# Patient Record
Sex: FEMALE | Race: BLACK OR AFRICAN AMERICAN | NOT HISPANIC OR LATINO | Employment: FULL TIME | URBAN - METROPOLITAN AREA
[De-identification: names, ages, dates, MRNs, and addresses within clinical notes are randomized per-mention and may not be internally consistent; named-entity substitution may affect disease eponyms.]

---

## 2023-10-08 ENCOUNTER — HOSPITAL ENCOUNTER (EMERGENCY)
Facility: HOSPITAL | Age: 48
Discharge: HOME | End: 2023-10-08
Attending: EMERGENCY MEDICINE

## 2023-10-08 VITALS
OXYGEN SATURATION: 99 % | HEART RATE: 69 BPM | SYSTOLIC BLOOD PRESSURE: 112 MMHG | TEMPERATURE: 97 F | BODY MASS INDEX: 36.7 KG/M2 | HEIGHT: 64 IN | WEIGHT: 215 LBS | DIASTOLIC BLOOD PRESSURE: 61 MMHG | RESPIRATION RATE: 16 BRPM

## 2023-10-08 DIAGNOSIS — M54.2 NECK PAIN: Primary | ICD-10-CM

## 2023-10-08 PROCEDURE — 2500000005 HC RX 250 GENERAL PHARMACY W/O HCPCS

## 2023-10-08 PROCEDURE — 2500000004 HC RX 250 GENERAL PHARMACY W/ HCPCS (ALT 636 FOR OP/ED)

## 2023-10-08 PROCEDURE — 99284 EMERGENCY DEPT VISIT MOD MDM: CPT | Performed by: EMERGENCY MEDICINE

## 2023-10-08 PROCEDURE — 20552 NJX 1/MLT TRIGGER POINT 1/2: CPT | Performed by: EMERGENCY MEDICINE

## 2023-10-08 PROCEDURE — 20552 NJX 1/MLT TRIGGER POINT 1/2: CPT

## 2023-10-08 PROCEDURE — 20552 NJX 1/MLT TRIGGER POINT 1/2: CPT | Mod: GC | Performed by: EMERGENCY MEDICINE

## 2023-10-08 PROCEDURE — 96372 THER/PROPH/DIAG INJ SC/IM: CPT

## 2023-10-08 RX ORDER — KETOROLAC TROMETHAMINE 30 MG/ML
30 INJECTION, SOLUTION INTRAMUSCULAR; INTRAVENOUS ONCE
Status: COMPLETED | OUTPATIENT
Start: 2023-10-08 | End: 2023-10-08

## 2023-10-08 RX ORDER — BUPIVACAINE HYDROCHLORIDE 5 MG/ML
2 INJECTION, SOLUTION PERINEURAL ONCE
Status: DISCONTINUED | OUTPATIENT
Start: 2023-10-08 | End: 2023-10-08

## 2023-10-08 RX ORDER — CYCLOBENZAPRINE HCL 10 MG
10 TABLET ORAL 2 TIMES DAILY PRN
Qty: 10 TABLET | Refills: 0 | Status: SHIPPED | OUTPATIENT
Start: 2023-10-08 | End: 2023-10-13

## 2023-10-08 RX ORDER — LIDOCAINE 50 MG/G
1 PATCH TOPICAL DAILY
Qty: 10 PATCH | Refills: 0 | Status: SHIPPED | OUTPATIENT
Start: 2023-10-08

## 2023-10-08 RX ORDER — NAPROXEN 500 MG/1
500 TABLET ORAL
Qty: 30 TABLET | Refills: 0 | Status: SHIPPED | OUTPATIENT
Start: 2023-10-08 | End: 2023-10-23

## 2023-10-08 RX ORDER — BUPIVACAINE HYDROCHLORIDE 5 MG/ML
2 INJECTION, SOLUTION EPIDURAL; INTRACAUDAL ONCE
Status: COMPLETED | OUTPATIENT
Start: 2023-10-08 | End: 2023-10-08

## 2023-10-08 RX ORDER — LIDOCAINE 560 MG/1
1 PATCH PERCUTANEOUS; TOPICAL; TRANSDERMAL DAILY
Status: DISCONTINUED | OUTPATIENT
Start: 2023-10-08 | End: 2023-10-08 | Stop reason: HOSPADM

## 2023-10-08 RX ADMIN — LIDOCAINE 1 PATCH: 4 PATCH TOPICAL at 09:38

## 2023-10-08 RX ADMIN — KETOROLAC TROMETHAMINE 30 MG: 30 INJECTION, SOLUTION INTRAMUSCULAR; INTRAVENOUS at 09:38

## 2023-10-08 RX ADMIN — BUPIVACAINE HYDROCHLORIDE 10 MG: 5 INJECTION, SOLUTION EPIDURAL; INTRACAUDAL at 09:15

## 2023-10-08 ASSESSMENT — COLUMBIA-SUICIDE SEVERITY RATING SCALE - C-SSRS
2. HAVE YOU ACTUALLY HAD ANY THOUGHTS OF KILLING YOURSELF?: NO
6. HAVE YOU EVER DONE ANYTHING, STARTED TO DO ANYTHING, OR PREPARED TO DO ANYTHING TO END YOUR LIFE?: NO
1. IN THE PAST MONTH, HAVE YOU WISHED YOU WERE DEAD OR WISHED YOU COULD GO TO SLEEP AND NOT WAKE UP?: NO

## 2023-10-08 ASSESSMENT — PAIN SCALES - GENERAL: PAINLEVEL_OUTOF10: 10 - WORST POSSIBLE PAIN

## 2023-10-08 ASSESSMENT — PAIN - FUNCTIONAL ASSESSMENT: PAIN_FUNCTIONAL_ASSESSMENT: 0-10

## 2023-10-08 NOTE — ED PROVIDER NOTES
HPI   Chief Complaint   Patient presents with    Neck Pain     Pt presents to the ed for r sided neck pain since Friday.  Pt denies injury       Patient is an otherwise healthy 49-year-old female presenting for evaluation of 2 days of right-sided neck pain.  No inciting traumas.  She states that is very tight and painful causing her to have difficulties with sleeping or turning her head.  This is never happened before.  She has never had surgery on her neck.  No fevers chills numbness tingling weakness.  No infectious symptoms including fevers or chills no chest pain shortness of breath abdominal pain or swelling.                          No data recorded                Patient History   Past Medical History:   Diagnosis Date    Complete or unspecified spontaneous  without complication         Encounter for  delivery without indication      delivery delivered    Encounter for screening for malignant neoplasm of cervix     Encounter for Papanicolaou smear for cervical cancer screening    Encounter for screening for malignant neoplasm of vagina     Vaginal Pap smear    Other conditions influencing health status     History of pregnancy    Other conditions influencing health status     Onset of menses    Personal history of contraception     Personal history of contraceptive use    Personal history of other diseases of the nervous system and sense organs     History of migraine    Personal history of other medical treatment     History of screening mammography    Unspecified diabetes mellitus in pregnancy, unspecified trimester     Maternal diabetes mellitus, antepartum     Past Surgical History:   Procedure Laterality Date     SECTION, LOW TRANSVERSE  2014     Section Low Transverse    OTHER SURGICAL HISTORY  2014    Tubal Ligation During  Section     No family history on file.  Social History     Tobacco Use    Smoking status: Not on file     Smokeless tobacco: Not on file   Substance Use Topics    Alcohol use: Not on file    Drug use: Not on file       Physical Exam   ED Triage Vitals [10/08/23 0756]   Temp Heart Rate Resp BP   36.1 °C (97 °F) 69 16 112/61      SpO2 Temp src Heart Rate Source Patient Position   99 % -- Monitor --      BP Location FiO2 (%)     -- --       Physical Exam  Vitals and nursing note reviewed.   Constitutional:       General: She is not in acute distress.     Appearance: She is well-developed.   HENT:      Head: Normocephalic and atraumatic.   Eyes:      Conjunctiva/sclera: Conjunctivae normal.   Neck:      Comments: Paraspinal musculature along right side of neck is hypertonic and warm to touch.  Cardiovascular:      Rate and Rhythm: Normal rate and regular rhythm.      Heart sounds: No murmur heard.  Pulmonary:      Effort: Pulmonary effort is normal. No respiratory distress.      Breath sounds: Normal breath sounds.   Abdominal:      Palpations: Abdomen is soft.      Tenderness: There is no abdominal tenderness.   Musculoskeletal:         General: No swelling.      Cervical back: Pain with movement present. No spinous process tenderness. Decreased range of motion.   Skin:     General: Skin is warm and dry.      Capillary Refill: Capillary refill takes less than 2 seconds.   Neurological:      Mental Status: She is alert.   Psychiatric:         Mood and Affect: Mood normal.         ED Course & MDM   Diagnoses as of 10/08/23 0931   Neck pain       Medical Decision Making  Patient was evaluated in the emergency department for neck pain.  Her vitals were stable upon arrival to the ED. no symptoms of paresthesias or weakness.  Pulses equal and intact in bilateral upper extremities.  Patient has warm hypertonic right sided musculature along his paraspinal muscles within her cervical spine.  Patient was given heat packs lidocaine patch Toradol injection here and will be provided with prescription for lidocaine patches naproxen and  Flexeril for home.  Discussed risks of muscle relaxers and operating heavy machinery.  Discussed other possible treatments including gentle stretching and went over some stretches to assist with neck pain.  Also discussed using heat packs at home.  Discussed a trigger point injection into the area for the patient and after risk-benefit discussion patient agreed to procedure.  Patient tolerated procedure well and had immediate relief of pain in the area.  Patient is feeling well no other concerns today and will be discharged home with primary care follow-up.  Prescription sent to patient's preferred pharmacy.  No further questions at this time.        Procedure  Nerve Block    Performed by: Gabriella Ramírez DO  Authorized by: Haris Fischer DO    Consent:     Consent obtained:  Verbal    Consent given by:  Patient    Risks, benefits, and alternatives were discussed: yes      Risks discussed:  Nerve damage, swelling, unsuccessful block, pain and bleeding    Alternatives discussed:  No treatment and alternative treatment  Universal protocol:     Patient identity confirmed:  Verbally with patient and hospital-assigned identification number  Indications:     Indications:  Pain relief  Location:     Body area:  Head    Head nerve:  Cervical    Laterality:  Right  Pre-procedure details:     Skin preparation:  Alcohol  Procedure details:     Block needle gauge:  22 G    Anesthetic injected:  Bupivacaine 0.5% w/o epi (1cc)    Injection procedure:  Anatomic landmarks identified, negative aspiration for blood, incremental injection and anatomic landmarks palpated    Paresthesia:  None  Post-procedure details:     Outcome:  Pain relieved    Procedure completion:  Tolerated well, no immediate complications  Comments:      R paraspinal musculature trigger point injection       Gabriella Ramírez DO  Resident  10/08/23 0944

## 2024-03-09 ENCOUNTER — HOSPITAL ENCOUNTER (EMERGENCY)
Facility: HOSPITAL | Age: 49
Discharge: HOME | End: 2024-03-09
Attending: STUDENT IN AN ORGANIZED HEALTH CARE EDUCATION/TRAINING PROGRAM
Payer: COMMERCIAL

## 2024-03-09 ENCOUNTER — APPOINTMENT (OUTPATIENT)
Dept: RADIOLOGY | Facility: HOSPITAL | Age: 49
End: 2024-03-09
Payer: COMMERCIAL

## 2024-03-09 VITALS
HEART RATE: 81 BPM | OXYGEN SATURATION: 98 % | RESPIRATION RATE: 18 BRPM | DIASTOLIC BLOOD PRESSURE: 77 MMHG | HEIGHT: 63 IN | SYSTOLIC BLOOD PRESSURE: 129 MMHG | WEIGHT: 215 LBS | TEMPERATURE: 97.5 F | BODY MASS INDEX: 38.09 KG/M2

## 2024-03-09 DIAGNOSIS — S93.402A SPRAIN OF LEFT ANKLE, INITIAL ENCOUNTER: Primary | ICD-10-CM

## 2024-03-09 PROCEDURE — 99284 EMERGENCY DEPT VISIT MOD MDM: CPT | Performed by: STUDENT IN AN ORGANIZED HEALTH CARE EDUCATION/TRAINING PROGRAM

## 2024-03-09 PROCEDURE — 99283 EMERGENCY DEPT VISIT LOW MDM: CPT

## 2024-03-09 PROCEDURE — 73610 X-RAY EXAM OF ANKLE: CPT | Mod: LT

## 2024-03-09 PROCEDURE — 73610 X-RAY EXAM OF ANKLE: CPT | Mod: LEFT SIDE | Performed by: RADIOLOGY

## 2024-03-09 ASSESSMENT — COLUMBIA-SUICIDE SEVERITY RATING SCALE - C-SSRS
1. IN THE PAST MONTH, HAVE YOU WISHED YOU WERE DEAD OR WISHED YOU COULD GO TO SLEEP AND NOT WAKE UP?: NO
6. HAVE YOU EVER DONE ANYTHING, STARTED TO DO ANYTHING, OR PREPARED TO DO ANYTHING TO END YOUR LIFE?: NO
2. HAVE YOU ACTUALLY HAD ANY THOUGHTS OF KILLING YOURSELF?: NO

## 2024-03-09 NOTE — ED TRIAGE NOTES
"Hx of bilateral ankle fx, 6 days ago pt states she heard a \"pop\" and is now having pain in right ankle. Attempted nsaids, tylenol, ice, elevation. Pt denies trauma and reports \"just walking.\" Ambulatory in triage.   "

## 2024-03-09 NOTE — DISCHARGE INSTRUCTIONS
You were seen in the emergency department for left ankle pain.  Your x-ray showed no problems with hardware and no fractures or dislocation.  At home, you can continue to rest, ice, and elevate the ankle.  We have provided a post-op shoe for support and a referral to orthopedic surgery for follow-up.  At home, you can take Tylenol or ibuprofen for pain.  You should come back to the emergency department if you have worsening pain, inability to walk or support weight on the ankle, or any new concerns.

## 2024-03-09 NOTE — ED PROVIDER NOTES
CC: Ankle Pain     HPI:  Radha Ramirez is a 48 y.o. female with PMHx bilateral ankle fx who presents with L ankle pain for 6 days. Reports she was walking to the bathroom at home and felt a pop followed by pain. Has tried NSAIDs, tylenol, ice, elevation at home without resolution of her symptoms.    On chart review, L ankle is s/p ORIF in 2019.     Limitations to History: none  Additional History provided by: N/A    External Records Reviewed:  Recent available ED and inpatient notes reviewed in EMR.    PMHx/PSHx:  Per HPI.   - has a past medical history of Complete or unspecified spontaneous  without complication, Encounter for  delivery without indication, Encounter for screening for malignant neoplasm of cervix, Encounter for screening for malignant neoplasm of vagina, Other conditions influencing health status, Other conditions influencing health status, Personal history of contraception, Personal history of other diseases of the nervous system and sense organs, Personal history of other medical treatment, and Unspecified diabetes mellitus in pregnancy, unspecified trimester.  - has a past surgical history that includes  section, low transverse (2014) and Other surgical history (2014).    Medications:  Reviewed in EMR. See EMR for complete list of medications and doses.    Allergies:  Patient has no known allergies.    Social History:  - Tobacco:  has no history on file for tobacco use.   - Alcohol:  has no history on file for alcohol use.   - Illicit Drugs:  has no history on file for drug use.     ROS:  Per HPI.     ???????????????????????????????????????????????????????????????  Triage Vitals:  T 36.4 °C (97.5 °F)  HR 81  /77  RR 18  O2 98 %      Physical Exam  Vitals reviewed.   Constitutional:       General: She is not in acute distress.  HENT:      Head: Normocephalic and atraumatic.      Mouth/Throat:      Mouth: Mucous membranes are moist.      Pharynx:  Oropharynx is clear.   Eyes:      Conjunctiva/sclera: Conjunctivae normal.      Pupils: Pupils are equal, round, and reactive to light.   Neck:      Comments: 2+ PT pulses bilaterally  Cardiovascular:      Rate and Rhythm: Normal rate and regular rhythm.      Heart sounds: No murmur heard.     No friction rub. No gallop.   Pulmonary:      Effort: Pulmonary effort is normal.      Breath sounds: No wheezing, rhonchi or rales.   Abdominal:      Palpations: Abdomen is soft.      Tenderness: There is no abdominal tenderness. There is no guarding.   Musculoskeletal:         General: No tenderness or deformity.      Cervical back: Normal range of motion.      Right lower leg: No edema.      Left lower leg: No edema.      Comments: Left ankle ROM somewhat limited by pain   Skin:     General: Skin is warm and dry.      Findings: No rash.   Neurological:      General: No focal deficit present.      Mental Status: She is alert and oriented to person, place, and time.   Psychiatric:         Mood and Affect: Mood normal.         Behavior: Behavior normal.       ???????????????????????????????????????????????????????????????  ED Course:  ED Course as of 03/09/24 1217   Sat Mar 09, 2024   1117 XR ankle left 3+ views  I independently reviewed plain film of the left ankle: Hardware appears stable from postop films.  No obvious fracture or dislocation. [HH]      ED Course User Index  [HH] Louann Rodriguez MD         Diagnoses as of 03/09/24 1217   Sprain of left ankle, initial encounter       EKG & Images:  Independently reviewed, See ED Course      MDM:  Radha Ramirez is a 48 y.o. female with PMHx of bilateral ankle ORIF who presents with L ankle pain.  On arrival, the patient was afebrile and hemodynamically stable.  On exam, patient is able to support weight on the foot, there is no significant edema or tenderness to palpation of the left ankle, however ROM is somewhat limited by pain.     Differential diagnosis includes ankle  sprain, fracture, hardware malfunction, or other soft tissue injury.    XR L ankle with stable appearance of hardware, no acute fx or malalignment.  Discussed this with the patient, who expressed understanding.  She was provided with a postop shoe for ankle support and advised to continue to rest, ice, and elevate the ankle. She was provided with a referral for follow-up with orthopedics.  Patient was discharged home in stable condition.    Final diagnoses:   [S93.402A] Sprain of left ankle, initial encounter     Patient seen and discussed with Dr. Avelar.    Social Determinants Limiting Care:  None identified    Disposition:  Discharge    Louann Rodriguez MD   Emergency Medicine PGY1  [facility]     Disclaimer: This note was dictated by speech recognition. Minor errors in transcription may be present     ? [employer] last updated 3/9/2024 12:17 PM        Louann Rodriguez MD  Resident  03/09/24 0109

## 2024-04-15 ENCOUNTER — HOSPITAL ENCOUNTER (OUTPATIENT)
Dept: RADIOLOGY | Facility: HOSPITAL | Age: 49
Discharge: HOME | End: 2024-04-15
Payer: COMMERCIAL

## 2024-04-15 ENCOUNTER — OFFICE VISIT (OUTPATIENT)
Dept: ORTHOPEDIC SURGERY | Facility: HOSPITAL | Age: 49
End: 2024-04-15
Payer: COMMERCIAL

## 2024-04-15 VITALS — BODY MASS INDEX: 38.09 KG/M2 | HEIGHT: 63 IN | WEIGHT: 215 LBS

## 2024-04-15 DIAGNOSIS — M25.572 ACUTE LEFT ANKLE PAIN: ICD-10-CM

## 2024-04-15 DIAGNOSIS — M25.872 ANKLE IMPINGEMENT SYNDROME, LEFT: Primary | ICD-10-CM

## 2024-04-15 PROCEDURE — 73610 X-RAY EXAM OF ANKLE: CPT | Mod: LT

## 2024-04-15 PROCEDURE — 99214 OFFICE O/P EST MOD 30 MIN: CPT | Performed by: ORTHOPAEDIC SURGERY

## 2024-04-15 PROCEDURE — 73610 X-RAY EXAM OF ANKLE: CPT | Mod: LEFT SIDE | Performed by: RADIOLOGY

## 2024-04-15 ASSESSMENT — PAIN DESCRIPTION - DESCRIPTORS: DESCRIPTORS: SHARP

## 2024-04-15 ASSESSMENT — PAIN - FUNCTIONAL ASSESSMENT: PAIN_FUNCTIONAL_ASSESSMENT: 0-10

## 2024-04-15 NOTE — PROGRESS NOTES
HISTORY OF PRESENT ILLNESS  This is a pleasant 48 y.o. year old female  who presents on 04/15/2024 at the request of Monique Burnette MD for evaluation of  left ankle  pain that has been present over/since 4 weeks.    How the condition occurred or started: insidious, no recent trauma  Location of pain (patient points to): anterior area pinching  Quality of pain:  better now, mild  Modifying Factors: worse with walking after sitting  Associated Signs and symptoms: no swelling, no catching or deep popping  Previous Treatment: ED gave her a post-op shoe and she weaned out of it    PHYSICAL EXAMINATION  Constitutional Exam: patient's height and weight reviewed, well-kempt  Psychiatric Exam: alert and oriented x 3, appropriate mood and behavior  Eye Exam: CHARLEE, EOMI  Pulmonary Exam: breathing non-labored, no apparent distress  Lymphatic exam: no appreciable lymphadenopathy in the lower extremities  Cardiovascular exam: DP pulses 2+ bilaterally, PT 2+ bilaterally, toes are pink with good capillary refill, no pitting edema  Skin exam: no open lesions, rashes, abrasions or ulcerations  Neurological exam: sensation to light touch intact in both lower extremities in peripheral and dermatomal distributions (except for any abnormalities noted in musculoskeletal exam)    Musculoskeletal exam: right ankle: soreness over anterior capsule of ankle ant-med and ant-lateral, no pain over ATFL or CFL, no ankle effusion, ROM of ankle good, neg DF-eversion stress test, no pain to palpation over peroneals and posterior tibial tendon, neg shuck test, neg anterior drawer and neg talar tilt test, no pain over bony structures of foot and ankle, normal standing alignment, min pain with passive hyperdorsiflexion test    DATA/RESULTS REVIEW: I personally reviewed the patient's x-ray images and reports of the  left ankle shows well healed distal fibular fracture with no screw loosening, ankle alignment appropriate, tibiotalar joint spacing  "maintained and no spur formation, no acute findings or fractures .     ASSESSMENT: left ankle soft tissue anterior impingement, hx of left ankle distal fibular ORIF 3 years ago  PLAN: I discussed with the patient the differential diagnosis, complex post-traumatic related, overuse nature of the condition(s) and available treatment option(s).  Reviewed with her key exercises to do to work on strengthening since she has some residual left calf atrophy.  Gave her a program and instructed her on cross friction massage with aspercreme/voltaren gel over the anterior capsule.  She would like to avoid doing formal PT at this time.  Discussed cardio exercise progression starting with bike first and working up to walking program.  She should do well with nonop management.  FUV prn.  The patient's questions were answered in detail.      Note dictated with [employer] software, completed without full type editing to avoid delay.      Dear Referring Physician,  It was my pleasure to see your patient, in the office today.  Please refer to the detailed notes above for my findings and recommendations.  Thank you once again for your consultation request.  If you have any further questions or concerns, please feel free to contact me via email or at the phone number and address below.  Sincerely,    Sunitha Giles MD   of Orthopedic Surgery, [employer]  Dual Fellowship-trained in Orthopedic Sports Medicine (Knee and Shoulder), and Foot and Ankle Surgery  The  [facility] [place]   [employer] Subspecialty Certificate in Orthopedic Sports Medicine  Head Team Physician Case \"[place]\" and [facility] \"[place]\"  Team USA OP Physician [id number] Paralympic Games  Team [employer] US Figure Skating Medical Practitioner, including International Event Coverage  Director, Foot and Ankle Division, Department of Orthopedics    [facility] " [place] [id number]  [email or web address]  Office [phone]

## 2024-04-17 ENCOUNTER — OFFICE VISIT (OUTPATIENT)
Dept: OTOLARYNGOLOGY | Facility: CLINIC | Age: 49
End: 2024-04-17
Payer: COMMERCIAL

## 2024-04-17 ENCOUNTER — CLINICAL SUPPORT (OUTPATIENT)
Dept: AUDIOLOGY | Facility: CLINIC | Age: 49
End: 2024-04-17
Payer: COMMERCIAL

## 2024-04-17 VITALS — WEIGHT: 210 LBS | HEIGHT: 63 IN | BODY MASS INDEX: 37.21 KG/M2

## 2024-04-17 DIAGNOSIS — H91.8X3 ASYMMETRICAL HEARING LOSS: ICD-10-CM

## 2024-04-17 DIAGNOSIS — H91.22 SUDDEN LEFT HEARING LOSS: ICD-10-CM

## 2024-04-17 DIAGNOSIS — H90.3 SENSORINEURAL HEARING LOSS (SNHL) OF BOTH EARS: Primary | ICD-10-CM

## 2024-04-17 DIAGNOSIS — H90.3 BILATERAL SENSORINEURAL HEARING LOSS: Primary | ICD-10-CM

## 2024-04-17 PROCEDURE — 92567 TYMPANOMETRY: CPT

## 2024-04-17 PROCEDURE — 92557 COMPREHENSIVE HEARING TEST: CPT

## 2024-04-17 PROCEDURE — 99204 OFFICE O/P NEW MOD 45 MIN: CPT | Performed by: NURSE PRACTITIONER

## 2024-04-17 RX ORDER — PREDNISONE 10 MG/1
TABLET ORAL DAILY
Qty: 42 TABLET | Refills: 0 | Status: SHIPPED | OUTPATIENT
Start: 2024-04-17 | End: 2024-04-27

## 2024-04-17 RX ORDER — OMEPRAZOLE 20 MG/1
CAPSULE, DELAYED RELEASE ORAL
Qty: 10 CAPSULE | Refills: 0 | Status: SHIPPED | OUTPATIENT
Start: 2024-04-17

## 2024-04-17 ASSESSMENT — PAIN - FUNCTIONAL ASSESSMENT: PAIN_FUNCTIONAL_ASSESSMENT: 0-10

## 2024-04-17 ASSESSMENT — PATIENT HEALTH QUESTIONNAIRE - PHQ9
2. FEELING DOWN, DEPRESSED OR HOPELESS: NOT AT ALL
1. LITTLE INTEREST OR PLEASURE IN DOING THINGS: NOT AT ALL
SUM OF ALL RESPONSES TO PHQ9 QUESTIONS 1 AND 2: 0

## 2024-04-17 ASSESSMENT — PAIN SCALES - GENERAL: PAINLEVEL_OUTOF10: 0 - NO PAIN

## 2024-04-17 NOTE — PROGRESS NOTES
AUDIOLOGIC EVALUATION      Name:  Radha Ramirez  :  1975  Age:  48 y.o.  Date of Evaluation:  2024    Time: 900-270    HISTORY:  Radha Ramirez, 48 y.o., was seen for an audiologic assessment prior to ENT evaluation. She reported decreased hearing in both ears following a party on 3/31/24. She feels that her left ear is poorer. She noted that sounds seem far away and speech seems distorted. She also feels like she hears her own voice in her head, as if she had a cold. She has a history of myringotomy in childhood related to an ear infection, but no tubes were placed. She denied otalgia, otorrhea, tinnitus, dizziness, aural pressure/fullness, family history of hearing loss, and recent falls.       RESULTS:  Otoscopic Evaluation:  Right Ear: Unremarkable  Left Ear: Unremarkable    Immittance Measures:  Right Ear: Type A -- indicating normal volume, pressure, and static compliance  Left Ear: Type A -- indicating normal volume, pressure, and static compliance    Acoustic Reflexes:  Right Ear: Thresholds present at expected levels for 500-4000 Hz.  Left Ear: Thresholds present at expected levels for 500-4000 Hz.    Pure Tone Audiometry:    Right Ear: Hearing within normal limits sloping to a severe sensorineural hearing loss  Left Ear: Hearing within normal limits sloping to a severe sensorineural hearing loss    Asymmetry: Yes, left ear poorer at 3000 Hz    No previous audiogram to compare today's results to.    Reliability:   Good    Speech Audiometry:   Right: Speech Reception Threshold (SRT) was obtained at 25 dBHL.   SRT/PTA in Good agreement with pure tone average.    Left: Speech Reception Threshold (SRT) was obtained at 30 dBHL.   SRT/PTA in Good agreement with pure tone average.    Word Recognition Scores (WRS):  Right Ear: excellent (100%) in quiet when words were presented at 75 dBHL w/ masking.  Left Ear: excellent (96%) in quiet when words were presented at 80 dBHL w/ masking.      Asymmetry: No      IMPRESSIONS:  Today's testing revealed normal ear canal volume, middle ear pressure, and tympanic membrane compliance. She has hearing within normal limits sloping to a severe hearing loss in both ears. The left ear is poorer at 3000 Hz. I don't have an previous audiograms to compare today's results to. The results were discussed with the patient. She should follow-up with ENT as scheduled.       RECOMMENDATIONS:  1.) Continue medical follow up with primary care provider and/or Ears Nose and Throat (ENT) provider as recommended.  2.) Return for audiologic evaluation in conjunction with medical management or annually (whichever is sooner) to monitor hearing sensitivity and assess middle ear status or sooner should concerns arise.  3.) Avoid exposure to loud sounds by moving away from the noise, turning down the volume, or wearing proper hearing protection correctly.      Kirill Avelar, CCC-A  Clinical Audiologist          KEY  SNHL Sensorineural Hearing Loss   CHL Conductive Hearing Loss   MHL Mixed Hearing Loss   SSNHL Sudden Sensorineural Hearing Loss   WNL Within Normal Limits   PTA Pure Tone Average   TM Tympanic Membrane   ECV Ear Canal Volume   SRT Speech Reception Threshold   WRS Word Recognition Score

## 2024-04-17 NOTE — PROGRESS NOTES
Subjective   Patient ID: Radha Ramirez is a 48 y.o. female who presents for New Patient Visit and Hearing Loss.  HPI  This patient is referred for evaluation of sudden hearing loss. The patient is not accompanied by anyone.   When asked about ear pain, hearing loss, discharge from ear, tinnitus, dizziness or vertigo, the patient admits to sudden left greater than right hearing loss beginning March 31, 2024.  Patient has been to a loud party the night before and when she woke in the morning she noticed bilateral hearing loss.  She is having a difficult time with localization of sound.   When asked about a significant past otological history including history of prior ear surgery, noise exposure, exposure to ototoxic drugs or agents, and/or family history of hearing loss, the patient admits to eardrum Tyler as a child and recreational noise exposure.    Review of Systems  A comprehensive or 10 points review of the patient´s constitutional, neurological, HEENT, pulmonary, cardiovascular and genito-urinary systems showed only those mentioned in history of present illness.    Objective   Physical Exam  Constitutional: no fever, chills, weight loss or weight gain   General appearance: Appears well, well-nourished, well groomed. No acute distress.   Communication: Normal communication   Psychiatric: Oriented to person, place and time. Normal mood and affect.   Neurologic: Cranial nerves II-XII grossly intact and symmetric bilaterally.   Head and Face:   Head: Atraumatic with no masses, lesions or scarring.   Face: Normal symmetry, no paralysis, synkinesis or facial tic. No scars or deformities.   Eyes: Conjunctiva not edematous or erythematous   Ears: External inspection of ears with no deformity, scars or masses. Bilateral EACs clear and bilateral TMs intact with no signs of effusions     Neck: Normal appearing, symmetric, trachea midline.   Cardiovascular: Examination of peripheral vascular system shows no clubbing  or cyanosis.   Respiratory: No respiratory distress increased work of breathing. Inspection of the chest with symmetric chest expansion and normal respiratory effort.   Skin: No rashes in the head or neck  Bedside occulomotor function assessment for ocular pursuits and saccades, spontaneous nystagmus,  positional and postural testing (Romberg, Fukjose) is normal.    Assessment/Plan     This patient presents for initial evaluation of acute acquired bilateral/asymmetrical sensorineural hearing loss with sudden left sensorineural hearing loss.    Reassurance given that otologic exam and balance testing today are normal.  Audiogram was reviewed in detail.  Due to the left greater than right sensorineural asymmetry, I recommended MRI IAC with and without contrast.  Due to the significant drop in her hearing being within 30 days, I recommended a prednisone taper.  I would like to see her back in 2 weeks with a repeat audiogram.  If there is still significant left greater than right asymmetry, I would offer an intratympanic steroid injection.  Patient is in agreement with the plan.  All questions were answered to patient's satisfaction.    This note was created using speech recognition transcription software. Despite proofreading, several typographical errors might be present that might affect the meaning of the content. Please call with any questions.         ARRON St-CNP 04/17/24 12:16 PM

## 2024-05-02 ENCOUNTER — APPOINTMENT (OUTPATIENT)
Dept: AUDIOLOGY | Facility: CLINIC | Age: 49
End: 2024-05-02
Payer: COMMERCIAL

## 2024-05-02 ENCOUNTER — APPOINTMENT (OUTPATIENT)
Dept: OTOLARYNGOLOGY | Facility: CLINIC | Age: 49
End: 2024-05-02
Payer: COMMERCIAL

## 2025-02-21 ENCOUNTER — OFFICE VISIT (OUTPATIENT)
Dept: OBSTETRICS AND GYNECOLOGY | Facility: HOSPITAL | Age: 50
End: 2025-02-21
Payer: COMMERCIAL

## 2025-02-21 VITALS
WEIGHT: 220 LBS | SYSTOLIC BLOOD PRESSURE: 137 MMHG | DIASTOLIC BLOOD PRESSURE: 84 MMHG | HEIGHT: 63 IN | BODY MASS INDEX: 38.98 KG/M2

## 2025-02-21 DIAGNOSIS — Z12.11 COLON CANCER SCREENING: ICD-10-CM

## 2025-02-21 DIAGNOSIS — Z12.31 ENCOUNTER FOR SCREENING MAMMOGRAM FOR MALIGNANT NEOPLASM OF BREAST: ICD-10-CM

## 2025-02-21 DIAGNOSIS — Z01.419 WOMEN'S ANNUAL ROUTINE GYNECOLOGICAL EXAMINATION: Primary | ICD-10-CM

## 2025-02-21 DIAGNOSIS — Z12.4 CERVICAL CANCER SCREENING: ICD-10-CM

## 2025-02-21 DIAGNOSIS — Z11.3 SCREEN FOR SEXUALLY TRANSMITTED DISEASES: ICD-10-CM

## 2025-02-21 PROCEDURE — 3008F BODY MASS INDEX DOCD: CPT | Performed by: OBSTETRICS & GYNECOLOGY

## 2025-02-21 PROCEDURE — 99396 PREV VISIT EST AGE 40-64: CPT | Performed by: OBSTETRICS & GYNECOLOGY

## 2025-02-21 SDOH — ECONOMIC STABILITY: FOOD INSECURITY: WITHIN THE PAST 12 MONTHS, YOU WORRIED THAT YOUR FOOD WOULD RUN OUT BEFORE YOU GOT MONEY TO BUY MORE.: NEVER TRUE

## 2025-02-21 SDOH — ECONOMIC STABILITY: FOOD INSECURITY: WITHIN THE PAST 12 MONTHS, THE FOOD YOU BOUGHT JUST DIDN'T LAST AND YOU DIDN'T HAVE MONEY TO GET MORE.: NEVER TRUE

## 2025-02-21 ASSESSMENT — ENCOUNTER SYMPTOMS
CONSTITUTIONAL NEGATIVE: 1
GASTROINTESTINAL NEGATIVE: 0
EYES NEGATIVE: 0
CONSTITUTIONAL NEGATIVE: 0
CARDIOVASCULAR NEGATIVE: 0
ENDOCRINE NEGATIVE: 0
RESPIRATORY NEGATIVE: 0
ALLERGIC/IMMUNOLOGIC NEGATIVE: 0
MUSCULOSKELETAL NEGATIVE: 0
PSYCHIATRIC NEGATIVE: 0
HEMATOLOGIC/LYMPHATIC NEGATIVE: 0
NEUROLOGICAL NEGATIVE: 0

## 2025-02-21 ASSESSMENT — LIFESTYLE VARIABLES
HOW OFTEN DO YOU HAVE SIX OR MORE DRINKS ON ONE OCCASION: NEVER
SKIP TO QUESTIONS 9-10: 1
HOW OFTEN DO YOU HAVE A DRINK CONTAINING ALCOHOL: MONTHLY OR LESS
AUDIT-C TOTAL SCORE: 1
HOW MANY STANDARD DRINKS CONTAINING ALCOHOL DO YOU HAVE ON A TYPICAL DAY: 1 OR 2

## 2025-02-21 ASSESSMENT — PATIENT HEALTH QUESTIONNAIRE - PHQ9
SUM OF ALL RESPONSES TO PHQ9 QUESTIONS 1 & 2: 0
1. LITTLE INTEREST OR PLEASURE IN DOING THINGS: NOT AT ALL
2. FEELING DOWN, DEPRESSED OR HOPELESS: NOT AT ALL

## 2025-02-21 ASSESSMENT — PAIN SCALES - GENERAL: PAINLEVEL_OUTOF10: 0-NO PAIN

## 2025-02-21 NOTE — PROGRESS NOTES
Subjective   Patient ID: Radha Ramirez is a 49 y.o. female who presents for Annual Exam (Patient here for annual exam/Patient has concerns about vaginal dryness/Patient denies menses/has IUD/Last PAP 5-27-22 [facility]/COLPOSCOPY 9-9-22/Patient has had IUD since /Patient never had colonoscopy /Last MAMM 1-16-23/Patient wants both STI testings/Patient denies falls/pain).     HPI    Here for annual exam   Would like STI testing   Needs pap today  Needs mammo and colonoscopy order     Recently had a stomach virus.   Review of Systems   Constitutional: Negative.    Genitourinary: Negative.        Objective   Physical Exam  Exam conducted with a chaperone present.   Constitutional:       Appearance: Normal appearance.   HENT:      Head: Normocephalic and atraumatic.      Right Ear: External ear normal.      Left Ear: External ear normal.      Nose: Nose normal.      Mouth/Throat:      Mouth: Mucous membranes are moist.      Pharynx: Oropharynx is clear.   Eyes:      Extraocular Movements: Extraocular movements intact.      Conjunctiva/sclera: Conjunctivae normal.   Cardiovascular:      Rate and Rhythm: Normal rate and regular rhythm.      Pulses: Normal pulses.      Heart sounds: Normal heart sounds.   Pulmonary:      Effort: Pulmonary effort is normal.      Breath sounds: Normal breath sounds.   Abdominal:      Palpations: Abdomen is soft.      Tenderness: There is no abdominal tenderness.   Genitourinary:     General: Normal vulva.      Vagina: Normal.      Cervix: Discharge present. No friability, erythema or cervical bleeding.      Uterus: Normal.       Adnexa: Right adnexa normal and left adnexa normal.   Musculoskeletal:         General: Normal range of motion.      Cervical back: Normal range of motion and neck supple.   Skin:     General: Skin is warm and dry.   Neurological:      General: No focal deficit present.      Mental Status: She is alert.         Assessment/Plan   Problem List Items Addressed This  Visit             ICD-10-CM    Women's annual routine gynecological examination - Primary Z01.419    Screen for sexually transmitted diseases Z11.3    Relevant Orders    HIV 1/2 Antigen/Antibody Screen with Reflex to Confirmation    Hepatitis B Surface Antigen    Hepatitis C Antibody    Syphilis Screen with Reflex     Other Visit Diagnoses         Codes    Encounter for screening mammogram for malignant neoplasm of breast     Z12.31    Relevant Orders    BI mammo bilateral screening tomosynthesis    Cervical cancer screening     Z12.4    Relevant Orders    THINPREP PAP    Colon cancer screening     Z12.11    Relevant Orders    Colonoscopy Screening; Average Risk Patient        48 y/o F presents for annual gyn examination. Healthy appearing, labs collected. Screening tests ordered.     Return as needed or for annual exam     Annette Nunez DO 02/21/25 10:16 AM

## 2025-02-22 LAB
HBV SURFACE AG SERPL QL IA: NORMAL
HCV AB SERPL QL IA: NORMAL
HIV 1+2 AB+HIV1 P24 AG SERPL QL IA: NORMAL
T PALLIDUM AB SER QL IA: NORMAL

## 2025-02-24 LAB
C TRACH RRNA SPEC QL NAA+PROBE: NEGATIVE
N GONORRHOEA DNA SPEC QL PROBE+SIG AMP: NEGATIVE

## 2025-02-24 NOTE — PROGRESS NOTES
I saw and evaluated the patient. I personally obtained the key and critical portions of the history and physical exam or was physically present for key and critical portions performed by the resident/fellow. I reviewed the resident/fellow's documentation and discussed the patient with the resident/fellow. I agree with the resident/fellow's medical decision making as documented in the note.    Monique Burnette MD

## 2025-02-25 ENCOUNTER — TELEPHONE (OUTPATIENT)
Dept: OBSTETRICS AND GYNECOLOGY | Facility: CLINIC | Age: 50
End: 2025-02-25
Payer: COMMERCIAL

## 2025-02-25 DIAGNOSIS — A59.9 TRICHOMONIASIS: Primary | ICD-10-CM

## 2025-02-25 LAB
HBV SURFACE AG SERPL QL IA: NORMAL
HCV AB SERPL QL IA: NORMAL
HIV 1+2 AB+HIV1 P24 AG SERPL QL IA: NORMAL
T PALLIDUM AB SER QL IA: NEGATIVE
T VAGINALIS RRNA SPEC QL NAA+PROBE: POSITIVE

## 2025-02-25 RX ORDER — METRONIDAZOLE 500 MG/1
500 TABLET ORAL 2 TIMES DAILY
Qty: 14 TABLET | Refills: 0 | Status: SHIPPED | OUTPATIENT
Start: 2025-02-25 | End: 2025-03-04

## 2025-02-25 NOTE — TELEPHONE ENCOUNTER
Spoke to pt verified by name/.  Pt contacted with positive Trichomonas result.  Pt advised to abstain from intercourse x7-10 days after treatment, inform partner, and safe sex practices.  Flagyl x7 days   Flagyl rx sent to pt's pharmacy.  Pt verbalized understanding. Denies any further questions.

## 2025-03-11 LAB
CYTOLOGY CMNT CVX/VAG CYTO-IMP: NORMAL
HPV HR 12 DNA GENITAL QL NAA+PROBE: NEGATIVE
HPV HR GENOTYPES PNL CVX NAA+PROBE: NEGATIVE
HPV16 DNA SPEC QL NAA+PROBE: NEGATIVE
HPV18 DNA SPEC QL NAA+PROBE: NEGATIVE
LAB AP HPV GENOTYPE QUESTION: YES
LAB AP HPV HR: NORMAL
LAB AP PAP ADDITIONAL TESTS: NORMAL
LABORATORY COMMENT REPORT: NORMAL
PATH REPORT.TOTAL CANCER: NORMAL

## 2025-03-13 ENCOUNTER — HOSPITAL ENCOUNTER (OUTPATIENT)
Dept: RADIOLOGY | Facility: HOSPITAL | Age: 50
Discharge: HOME | End: 2025-03-13
Payer: COMMERCIAL

## 2025-03-13 DIAGNOSIS — Z12.31 ENCOUNTER FOR SCREENING MAMMOGRAM FOR MALIGNANT NEOPLASM OF BREAST: ICD-10-CM

## 2025-03-13 PROCEDURE — 77067 SCR MAMMO BI INCL CAD: CPT

## 2025-03-13 PROCEDURE — 77067 SCR MAMMO BI INCL CAD: CPT | Performed by: RADIOLOGY

## 2025-03-13 PROCEDURE — 77063 BREAST TOMOSYNTHESIS BI: CPT | Performed by: RADIOLOGY

## 2025-05-30 ENCOUNTER — HOSPITAL ENCOUNTER (OUTPATIENT)
Dept: GASTROENTEROLOGY | Facility: HOSPITAL | Age: 50
Discharge: HOME | End: 2025-05-30
Payer: COMMERCIAL

## 2025-05-30 VITALS
RESPIRATION RATE: 16 BRPM | SYSTOLIC BLOOD PRESSURE: 103 MMHG | WEIGHT: 220 LBS | HEART RATE: 87 BPM | HEIGHT: 63 IN | DIASTOLIC BLOOD PRESSURE: 66 MMHG | BODY MASS INDEX: 38.98 KG/M2 | OXYGEN SATURATION: 99 % | TEMPERATURE: 97.6 F

## 2025-05-30 DIAGNOSIS — Z12.11 COLON CANCER SCREENING: ICD-10-CM

## 2025-05-30 PROCEDURE — 7100000009 HC PHASE TWO TIME - INITIAL BASE CHARGE

## 2025-05-30 PROCEDURE — 3700000012 HC SEDATION LEVEL 5+ TIME - INITIAL 15 MINUTES 5/> YEARS

## 2025-05-30 PROCEDURE — 45378 DIAGNOSTIC COLONOSCOPY: CPT | Performed by: INTERNAL MEDICINE

## 2025-05-30 PROCEDURE — 7100000010 HC PHASE TWO TIME - EACH INCREMENTAL 1 MINUTE

## 2025-05-30 PROCEDURE — 2500000004 HC RX 250 GENERAL PHARMACY W/ HCPCS (ALT 636 FOR OP/ED): Mod: JZ | Performed by: INTERNAL MEDICINE

## 2025-05-30 RX ORDER — MIDAZOLAM HYDROCHLORIDE 1 MG/ML
INJECTION, SOLUTION INTRAMUSCULAR; INTRAVENOUS AS NEEDED
Status: COMPLETED | OUTPATIENT
Start: 2025-05-30 | End: 2025-05-30

## 2025-05-30 RX ORDER — MEPERIDINE HYDROCHLORIDE 25 MG/ML
INJECTION INTRAMUSCULAR; INTRAVENOUS; SUBCUTANEOUS AS NEEDED
Status: COMPLETED | OUTPATIENT
Start: 2025-05-30 | End: 2025-05-30

## 2025-05-30 RX ORDER — MEPERIDINE HYDROCHLORIDE 50 MG/ML
INJECTION INTRAMUSCULAR; INTRAVENOUS; SUBCUTANEOUS AS NEEDED
Status: COMPLETED | OUTPATIENT
Start: 2025-05-30 | End: 2025-05-30

## 2025-05-30 RX ADMIN — MEPERIDINE HYDROCHLORIDE 50 MG: 50 INJECTION INTRAMUSCULAR; INTRAVENOUS; SUBCUTANEOUS at 14:18

## 2025-05-30 RX ADMIN — MEPERIDINE HYDROCHLORIDE 25 MG: 25 INJECTION INTRAMUSCULAR; INTRAVENOUS; SUBCUTANEOUS at 14:22

## 2025-05-30 RX ADMIN — MIDAZOLAM 2 MG: 1 INJECTION INTRAMUSCULAR; INTRAVENOUS at 14:18

## 2025-05-30 RX ADMIN — MIDAZOLAM 1 MG: 1 INJECTION INTRAMUSCULAR; INTRAVENOUS at 14:22

## 2025-05-30 ASSESSMENT — COLUMBIA-SUICIDE SEVERITY RATING SCALE - C-SSRS
2. HAVE YOU ACTUALLY HAD ANY THOUGHTS OF KILLING YOURSELF?: NO
1. IN THE PAST MONTH, HAVE YOU WISHED YOU WERE DEAD OR WISHED YOU COULD GO TO SLEEP AND NOT WAKE UP?: NO
6. HAVE YOU EVER DONE ANYTHING, STARTED TO DO ANYTHING, OR PREPARED TO DO ANYTHING TO END YOUR LIFE?: NO

## 2025-05-30 ASSESSMENT — PAIN - FUNCTIONAL ASSESSMENT
PAIN_FUNCTIONAL_ASSESSMENT: UNABLE TO SELF-REPORT
PAIN_FUNCTIONAL_ASSESSMENT: UNABLE TO SELF-REPORT
PAIN_FUNCTIONAL_ASSESSMENT: 0-10
PAIN_FUNCTIONAL_ASSESSMENT: UNABLE TO SELF-REPORT
PAIN_FUNCTIONAL_ASSESSMENT: 0-10

## 2025-05-30 ASSESSMENT — PAIN SCALES - GENERAL
PAINLEVEL_OUTOF10: 0 - NO PAIN

## 2025-05-30 NOTE — DISCHARGE INSTRUCTIONS

## 2025-05-30 NOTE — H&P
"History Of Present Illness  Radha Ramirez is a 49 y.o. female presenting here for open-access colonoscopy for average risk screening for colorectal cancer.     Past Medical History  Medical History[1]  Surgical History  Surgical History[2]  Social History  She reports that she has been smoking cigarettes. She has never used smokeless tobacco. She reports that she does not drink alcohol and does not use drugs.    Family History  Family History[3]     Allergies  Allergies[4]    Pre-sedation Evaluation:  ASA Classification - ASA 2 - Patient with mild systemic disease with no functional limitations  Mallampati Score - III (soft and hard palate and base of uvula visible)    Physical Exam  Constitutional:       Appearance: She is obese.   HENT:      Mouth/Throat:      Mouth: Mucous membranes are moist.   Eyes:      Conjunctiva/sclera: Conjunctivae normal.   Cardiovascular:      Rate and Rhythm: Normal rate.   Pulmonary:      Effort: Pulmonary effort is normal.   Abdominal:      Palpations: Abdomen is soft.   Skin:     General: Skin is warm.   Neurological:      Mental Status: She is oriented to person, place, and time.   Psychiatric:         Mood and Affect: Mood normal.          Last Recorded Vitals  Blood pressure (!) 115/100, pulse 77, temperature 36.4 °C (97.6 °F), temperature source Temporal, resp. rate 18, height 1.6 m (5' 3\"), weight 99.8 kg (220 lb), SpO2 100%.     Assessment/Plan   open-access colonoscopy for average risk screening for colorectal cancer.     PTA/Current Medications:  Prescriptions Prior to Admission[5]  Current Medications[6]  Ryder Solis MD       [1]   Past Medical History:  Diagnosis Date    Complete or unspecified spontaneous  without complication         Encounter for  delivery without indication ([facility])      delivery delivered    Encounter for screening for malignant neoplasm of cervix     Encounter for Papanicolaou smear for cervical cancer " screening    Encounter for screening for malignant neoplasm of vagina     Vaginal Pap smear    GERD (gastroesophageal reflux disease)     Other conditions influencing health status     History of pregnancy    Other conditions influencing health status     Onset of menses    Personal history of contraception     Personal history of contraceptive use    Personal history of other diseases of the nervous system and sense organs     History of migraine    Personal history of other medical treatment     History of screening mammography    Unspecified diabetes mellitus in pregnancy, unspecified trimester ([facility])     Maternal diabetes mellitus, antepartum   [2]   Past Surgical History:  Procedure Laterality Date     SECTION, LOW TRANSVERSE  2014     Section Low Transverse    OTHER SURGICAL HISTORY  2014    Tubal Ligation During  Section   [3] No family history on file.  [4] No Known Allergies  [5] (Not in a hospital admission)  [6]   Current Outpatient Medications   Medication Sig Dispense Refill    levonorgestrel (Mirena) 21 mcg/24 hours (8 yrs) 52 mg IUD 52 mg by intrauterine route.      cyclobenzaprine (Flexeril) 10 mg tablet Take 1 tablet (10 mg) by mouth 2 times a day as needed for muscle spasms for up to 5 days. 10 tablet 0    lidocaine (Lidoderm) 5 % patch Place 1 patch over 12 hours on the skin once daily. Remove & discard patch within 12 hours or as directed by MD. (Patient not taking: Reported on 2025) 10 patch 0    omeprazole (PriLOSEC) 20 mg DR capsule Do not crush or chew. While on prednisone 10 capsule 0     No current facility-administered medications for this encounter.

## 2025-06-01 ASSESSMENT — PAIN SCALES - GENERAL: PAINLEVEL_OUTOF10: 0 - NO PAIN
